# Patient Record
Sex: MALE | Race: WHITE | Employment: FULL TIME | URBAN - METROPOLITAN AREA
[De-identification: names, ages, dates, MRNs, and addresses within clinical notes are randomized per-mention and may not be internally consistent; named-entity substitution may affect disease eponyms.]

---

## 2018-05-02 ENCOUNTER — OFFICE VISIT (OUTPATIENT)
Dept: SURGERY | Age: 48
End: 2018-05-02

## 2018-05-02 VITALS
TEMPERATURE: 99.7 F | OXYGEN SATURATION: 96 % | BODY MASS INDEX: 41.19 KG/M2 | HEART RATE: 93 BPM | DIASTOLIC BLOOD PRESSURE: 93 MMHG | WEIGHT: 294.2 LBS | RESPIRATION RATE: 20 BRPM | SYSTOLIC BLOOD PRESSURE: 141 MMHG | HEIGHT: 71 IN

## 2018-05-02 DIAGNOSIS — E66.01 OBESITY, MORBID (HCC): Primary | ICD-10-CM

## 2018-05-02 NOTE — PROGRESS NOTES
Initial Consultation for Bariatric Surgery     Veronica Calderón is a 52 y.o. male who comes into the office today for initial consultation for the surgical options for the treatment of morbid obesity. The patient initially identified obesity in his 25s. Veronica Calderón has tried a variety of unsupervised weight-loss attempts including unsupervised diets, but has yet to meet with lasting success. Maximum weight lost on a diet is about 10 lbs, but that the weight always seems to return. Today, the patient is Height: 5' 11\" (180.3 cm) , Weight: 133.4 kg (294 lb 3.2 oz) for a BMI of Body mass index is 41.03 kg/(m^2). [place] Maximum weight is 296lbs. It is due to their severe obesity, which is further complicated by  hypertension and obstructive sleep apnea - clinical that the patient is now seeking out bariatric surgery. Challenges include portion control and having a sedentary job. Weight Loss Metrics 5/2/2018 5/2/2018 11/20/2017 10/1/2015 8/19/2015   Pre op / Initial Wt 294.2 - - - -   Today's Wt - 294 lb 3.2 oz 280 lb 285 lb 285 lb   BMI - 41.03 kg/m2 39.05 kg/m2 39.77 kg/m2 39.77 kg/m2   Ideal Body Wt 160 - - - -   Excess Body Wt 134.2 - - - -   Goal Wt 186.84 - - - -   Wt loss to date 0 - - - -   % Wt Loss 0 - - - -   80% .36 - - - -       Body mass index is 41.03 kg/(m^2). Past Medical History:   Diagnosis Date    Erectile dysfunction     Hypertension        Past Surgical History:   Procedure Laterality Date    HX TONSILLECTOMY         Current Outpatient Prescriptions   Medication Sig Dispense Refill    hydroCHLOROthiazide (MICROZIDE) 12.5 mg capsule Take 12.5 mg by mouth daily.       sildenafil (REVATIO) 20 mg tablet Take 2 1/2-5 tabs 1 hour prior to intercourse 90 Tab 3       No Known Allergies    Social History   Substance Use Topics    Smoking status: Never Smoker    Smokeless tobacco: Never Used    Alcohol use 0.0 oz/week     0 Standard drinks or equivalent per week      Comment: occ Family History   Problem Relation Age of Onset    Cancer Mother        ROS, positive where in bold:    General: fevers, chills, night sweats, fatigue, weight loss, weight gain    GI: abdominal pain, nausea, vomiting, change in bowel habits, hematochezia, melena, GERD, constipation    Integumentary: dermatitis or abnormal moles.     HEENT:  visual changes, vertigo, epistaxis, dysphagia, hoarseness    Cardiac: chest pain, palpitations, hypertension, edema, varicosities    Resp:  cough, shortness of breath, wheezing, hemoptysis, snoring, reactive airway disease    : hematuria, dysuria, frequency, urgency, nocturia, stress urinary incontinence    MSK: weakness, joint pain, arthritis    Endocrine: diabetes, thyroid disease, polyuria, polydipsia, polyphagia, poor wound healing, heat intolerance,cold intolerance    Lymph/Heme: anemia, bruising, history of blood transfusions    Neuro: dizziness, headache, fainting, seizures, stroke    Psychiatry:  anxiety, depression, psychosis      Physical Exam:  Visit Vitals    BP (!) 141/93 (BP 1 Location: Right arm, BP Patient Position: Sitting)    Pulse 93    Temp 99.7 °F (37.6 °C) (Oral)    Resp 20    Ht 5' 11\" (1.803 m)    Wt 133.4 kg (294 lb 3.2 oz)    SpO2 96%    BMI 41.03 kg/m2       General: Well developed, well nourished 52 y.o. male in no acute distress  ENMT: normocephalic, atraumatic mouth:clear, no overt lesions, oral mucosa pink and moist  Neck: supple, no masses, no adenopathy or carotid bruits, trachea midline  Skin: warm, smooth, dry and well perfused  Respiratory: clear to auscultation bilaterally, no wheeze, rhonchi or rales, excursions normal and symmetrical  Cardiovascular: Regular rate and rhythm, no murmurs, clicks, gallops or rubs, no edema or varicosities  Gastrointestinal: soft, nontender, nondistended, normoactive bowel sounds, no hernias, no hepatosplenomegaly, easily palpable costal margins, android distribution  Musculoskeletal: warm, well-perfused, no tenderness or swelling, normal gait/station  Neuro: sensation and strength grossly intact and symmetrical  Psych: alert and oriented to person, place and time      Impression:    Sweetie Edwards is a 52 y.o. male who is suffering from morbid obesity and its attendant comorbidities who would benefit from bariatric surgery. We have had an extensive discussion with regard to the risks, benefits and likely outcomes of both the sleeve and the gastric bypass. With regard to bypass, we have discussed the risks including bleeding, infection, need for reoperation, damage to surrounding structure, anastomotic leak, gastrogastric fistula ulcer and stricture, bowel obstruction due to internal hernia or adhesions, DVT, PE, heart attack, stroke and death. Patient also understands risks of inadequate weight loss, excess weight loss, vitamin insufficiency, protein malnutrition, excess skin, and loss of hair. We've discussed the restrictive nature of the sleeve gastrectomy. The patient understands the likelihood of losing approximately 65% of their excess weight in 12 to 18 months. Patient also understands the risks including but not limited to bleeding, infection, need for reoperation, leaks from staple line and strictures, bowel obstruction secondary to adhesions, DVT, PE, heart attack, stroke, and death. Patient also understands risks of inadequate weight loss, excess weight loss, vitamin insufficiency, protein malnutrition, excess skin, and loss of hair. We have reviewed the components of a successful postoperative course including requirement for a high protein, low carbohydrate diet, 60 oz a day of zero calorie liquids, daily vitamin supplementation, daily exercise, regular follow-up, and participation in support groups.  At this time we will enroll the patient in our bariatric program, undertake routine laboratory and radiographic evaluation, EKG, evaluation by nutritionist as well as psychologist and pending their satisfactory completion of the preop evaluation, plan to perform sleeve.     Patient will also have a sleep study as he and his fiancee describe episodes where he stops breathing at night

## 2018-05-02 NOTE — PATIENT INSTRUCTIONS
If you have any questions or concerns about today's appointment, the verbal and/or written instructions you were given for follow up care, please call our office at [phone].     [facility] - [address]    [phone] office  [phone]

## 2018-05-04 LAB
UREA BREATH TEST QL: NEGATIVE
UREA BREATH TEST QL: NORMAL

## 2018-06-11 ENCOUNTER — TELEPHONE (OUTPATIENT)
Dept: SURGERY | Age: 48
End: 2018-06-11

## 2019-10-24 ENCOUNTER — APPOINTMENT (OUTPATIENT)
Dept: PHYSICAL THERAPY | Age: 49
End: 2019-10-24

## 2019-10-28 ENCOUNTER — APPOINTMENT (OUTPATIENT)
Dept: PHYSICAL THERAPY | Age: 49
End: 2019-10-28